# Patient Record
Sex: MALE | Race: WHITE | Employment: STUDENT | URBAN - METROPOLITAN AREA
[De-identification: names, ages, dates, MRNs, and addresses within clinical notes are randomized per-mention and may not be internally consistent; named-entity substitution may affect disease eponyms.]

---
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
## 2025-01-03 DIAGNOSIS — L64.9 MALE PATTERN ALOPECIA: ICD-10-CM

## 2025-01-03 NOTE — TELEPHONE ENCOUNTER
Pt mother called and stated that the pt is needing a refill of his medication for hair loss but he wants to go back onto the one that he was before

## 2025-01-03 NOTE — TELEPHONE ENCOUNTER
Future Appointments:  Future Appointments   Date Time Provider Department Center   2/21/2025  8:40 AM Rachele Salazar, APRN - CNP [facility]        Last Appointment With Me:  7/26/2024     Requested Prescriptions     Pending Prescriptions Disp Refills    dutasteride (AVODART) 0.5 MG capsule 90 capsule 3     Sig: Take 1 capsule by mouth daily

## 2025-01-05 RX ORDER — DUTASTERIDE 0.5 MG/1
0.5 CAPSULE, LIQUID FILLED ORAL DAILY
Qty: 90 CAPSULE | Refills: 3 | Status: SHIPPED | OUTPATIENT
Start: 2025-01-05

## 2025-01-16 NOTE — TELEPHONE ENCOUNTER
Pt mother called about needing to switch pt back to Finasteride, dutasteride is making his hair fall out. Message about this was sent 1/3. Pt leaves for college Saturday. Please give call back

## 2025-01-17 NOTE — TELEPHONE ENCOUNTER
Future Appointments:  Future Appointments   Date Time Provider Department Center   2/21/2025  8:40 AM Rachele Salazar, APRN - CNP [facility]        Last Appointment With Me:  7/26/2024     Requested Prescriptions     Pending Prescriptions Disp Refills    finasteride (PROPECIA) 1 MG tablet 30 tablet 1     Sig: Take 1 tablet by mouth daily

## 2025-01-17 NOTE — TELEPHONE ENCOUNTER
I called and spoke to patient mom (on HIPAA) Patient is asking to switch the medication to the medicine he was really on. I explained to Rudy that I can ask the provider covering for Rachele and see if she's willing to replace the medication. Patient has an appointment with Rachele on 02/22.

## 2025-01-20 RX ORDER — FINASTERIDE 1 MG/1
1 TABLET, FILM COATED ORAL DAILY
Qty: 30 TABLET | Refills: 1 | OUTPATIENT
Start: 2025-01-20

## 2025-01-21 ENCOUNTER — TELEPHONE (OUTPATIENT)
Facility: CLINIC | Age: 20
End: 2025-01-21

## 2025-01-21 NOTE — TELEPHONE ENCOUNTER
Jones's mother (on HIPAA) called and stated that she was very upset that  refused to prescribe the finasteride. I explained to her that  was covering for Rachele and that she denied the medication but do not know the denial reason. I then stated that the patient has an appointment on 02/21 with Rachele and this can be discussed then. The patient's mother was upset and stated that she herself is a patient of Dr. Baumann. She has been trying to get him an appointment since Christmas. (There is no documentation of that.) She did not accept the information that I provided and requested to speak with an office manger, Tomsaa. I explained to her that Tomasa is currently out of the office but will send the message to have her reach out. She then said that \"if Tomasa gets paid by the company then she can give me a 5 minute phone call\". Patient's mother was very rude, saying that we are doing our job incorrectly, that we do not care about our patients. I explained to her that this was incorrect and I'm happy to let Tomasa and Rachele both know of the situation. She then said that would be sending Dr. Baumann a [employer] message as well. The patients mother said that \"someone needs to advocate for him, he cannot advocate for himself. He's too embarrassed. The boy is 19 years old and has hair loss in college\" I then apologized that he going through this I will have my manager reach out to you when she returns. The patients mother then hung up on me.

## 2025-05-01 ENCOUNTER — PATIENT MESSAGE (OUTPATIENT)
Facility: CLINIC | Age: 20
End: 2025-05-01

## 2025-07-01 ENCOUNTER — TELEMEDICINE (OUTPATIENT)
Facility: CLINIC | Age: 20
End: 2025-07-01
Payer: COMMERCIAL

## 2025-07-01 DIAGNOSIS — L64.9 MALE PATTERN ALOPECIA: Primary | ICD-10-CM

## 2025-07-01 PROCEDURE — 99213 OFFICE O/P EST LOW 20 MIN: CPT | Performed by: NURSE PRACTITIONER

## 2025-07-01 RX ORDER — FINASTERIDE 1 MG/1
1 TABLET, FILM COATED ORAL DAILY
Qty: 90 TABLET | Refills: 1 | Status: SHIPPED | OUTPATIENT
Start: 2025-07-01

## 2025-07-01 SDOH — ECONOMIC STABILITY: FOOD INSECURITY: WITHIN THE PAST 12 MONTHS, YOU WORRIED THAT YOUR FOOD WOULD RUN OUT BEFORE YOU GOT MONEY TO BUY MORE.: NEVER TRUE

## 2025-07-01 SDOH — ECONOMIC STABILITY: FOOD INSECURITY: WITHIN THE PAST 12 MONTHS, THE FOOD YOU BOUGHT JUST DIDN'T LAST AND YOU DIDN'T HAVE MONEY TO GET MORE.: NEVER TRUE

## 2025-07-01 ASSESSMENT — PATIENT HEALTH QUESTIONNAIRE - PHQ9
SUM OF ALL RESPONSES TO PHQ QUESTIONS 1-9: 0
2. FEELING DOWN, DEPRESSED OR HOPELESS: NOT AT ALL
SUM OF ALL RESPONSES TO PHQ QUESTIONS 1-9: 0
1. LITTLE INTEREST OR PLEASURE IN DOING THINGS: NOT AT ALL

## 2025-07-01 NOTE — PROGRESS NOTES
Corey Veliz, was evaluated through a synchronous (real-time) audio-video encounter. The patient (or guardian if applicable) is aware that this is a billable service, which includes applicable co-pays. This Virtual Visit was conducted with patient's (and/or legal guardian's) consent. Patient identification was verified, and a caregiver was present when appropriate.   The patient was located at Other: parked in car at [facility] in [facility]  Provider was located at Facility (Appt Dept): [address] [place] [id number]  Confirm you are appropriately licensed, registered, or certified to deliver care in the state where the patient is located as indicated above. If you are not or unsure, please re-schedule the visit: Yes, I confirm.     Corey Veliz (:  2005) is a Established patient, presenting virtually for evaluation of the following:    Chief Complaint   Patient presents with    Alopecia         Below is the assessment and plan developed based on review of pertinent history, physical exam, labs, studies, and medications.     Assessment & Plan  Male pattern alopecia   Chronic, worsening (exacerbation), changes made today: switch back from dutasteride to finasteride    Orders:    finasteride (PROPECIA) 1 MG tablet; Take 1 tablet by mouth daily      Return in about 4 months (around 2025) for Annual Physical.       Subjective   HPI  Mr. Veliz presents for virtual visit to discuss switching back from dutasteride to finasteride.  He has not noticed any adverse effects of dutasteride, but does not feel that it works as well as the finasteride works with respect to hair loss and hair regrowth.  He has noted worsening hair loss at the crown of his head.  We have discussed at length risks/benefits/potential side effects of 5-alpha reductase inhibitors  Will refill his finasteride at prior dose of 1 mg daily.    Review of Systems   Per HPI    Objective   Patient-Reported Vitals  No data

## 2025-07-01 NOTE — ASSESSMENT & PLAN NOTE
Chronic, worsening (exacerbation), changes made today: switch back from dutasteride to finasteride    Orders:    finasteride (PROPECIA) 1 MG tablet; Take 1 tablet by mouth daily

## 2025-07-22 RX ORDER — DESLORATADINE 5 MG/1
5 TABLET ORAL DAILY
Qty: 90 TABLET | Refills: 3 | Status: SHIPPED | OUTPATIENT
Start: 2025-07-22